# Patient Record
Sex: MALE | Race: WHITE | URBAN - METROPOLITAN AREA
[De-identification: names, ages, dates, MRNs, and addresses within clinical notes are randomized per-mention and may not be internally consistent; named-entity substitution may affect disease eponyms.]

---

## 2018-06-12 ENCOUNTER — OFFICE VISIT (OUTPATIENT)
Dept: FAMILY MEDICINE CLINIC | Age: 23
End: 2018-06-12

## 2018-06-12 VITALS
BODY MASS INDEX: 21.98 KG/M2 | HEART RATE: 54 BPM | HEIGHT: 71 IN | SYSTOLIC BLOOD PRESSURE: 128 MMHG | WEIGHT: 157 LBS | RESPIRATION RATE: 14 BRPM | OXYGEN SATURATION: 100 % | TEMPERATURE: 97.6 F | DIASTOLIC BLOOD PRESSURE: 66 MMHG

## 2018-06-12 DIAGNOSIS — K62.89 RECTAL PAIN: Primary | ICD-10-CM

## 2018-06-12 DIAGNOSIS — M79.89 SWELLING OF RIGHT HAND: ICD-10-CM

## 2018-06-12 RX ORDER — HYDROCORTISONE ACETATE 25 MG/1
25 SUPPOSITORY RECTAL
Qty: 10 SUPPOSITORY | Refills: 1 | Status: SHIPPED | OUTPATIENT
Start: 2018-06-12

## 2018-06-12 NOTE — MR AVS SNAPSHOT
[facility] 
 
 
 [address] 
[phone] Patient: Abel Marcano MRN: [id number] :1995 Visit Information Date & Time Provider Department Dept. Phone Encounter #  
 2018  9:30 AM Kimberlyn Valle,  [facility] [phone] [id number] Follow-up Instructions Return if symptoms worsen or fail to improve. Upcoming Health Maintenance Date Due  
 HPV Age 9Y-34Y (3 of 1 - Male 3 Dose Series) 2008 DTaP/Tdap/Td series (1 - Tdap) 2018 Influenza Age 5 to Adult 2018 Allergies as of 2018  Review Complete On: 2018 By: Brandon Gomez LPN Not on File Current Immunizations  Never Reviewed No immunizations on file. Not reviewed this visit You Were Diagnosed With   
  
 Codes Comments Rectal pain    -  Primary ICD-10-CM: K62.89 ICD-9-CM: 244.97 Swelling of right hand     ICD-10-CM: M79.89 ICD-9-CM: 729.81 Vitals BP Pulse Temp Resp Height(growth percentile) Weight(growth percentile) 128/66 (BP 1 Location: Left arm, BP Patient Position: Sitting) (!) 54 97.6 °F (36.4 °C) (Oral) 14 5' 11\" (1.803 m) 157 lb (71.2 kg) SpO2 BMI Smoking Status 100% 21.9 kg/m2 Never Smoker BMI and BSA Data Body Mass Index Body Surface Area  
 21.9 kg/m 2 1.89 m 2 Your Updated Medication List  
  
   
This list is accurate as of 18 10:16 AM.  Always use your most recent med list.  
  
  
  
  
 hydrocortisone 25 mg Supp Commonly known as:  ANUSOL-HC Insert 1 Suppository into rectum nightly. Prescriptions Printed Refills  
 hydrocortisone (ANUSOL-HC) 25 mg supp 1 Sig: Insert 1 Suppository into rectum nightly. Class: Print Route: Rectal  
  
Follow-up Instructions Return if symptoms worsen or fail to improve. To-Do List   
 2018   Imaging:  XR HAND RT MIN 3 V   
  
  
 Patient Instructions Anal Pain: Care Instructions Your Care Instructions Pain in the opening to the rectum (anus) can be caused by diarrhea or constipation or by scratching a rectal itch. A common cause of anal pain is a tear in the lining of the lower rectum (anal fissure). This type of anal pain usually goes away when the problem clears up. Injury during anal sex or from an object being placed in the rectum also can cause pain. A rare cause of anal pain is spasms of the muscles in the rectum. Some of these conditions may cause some light bleeding. Home treatment usually can relieve anal pain. If you continue to have anal pain, your doctor may prescribe medicine to relieve pain and other symptoms. Depending on the cause, you may need other treatment. Follow-up care is a key part of your treatment and safety. Be sure to make and go to all appointments, and call your doctor if you are having problems. It's also a good idea to know your test results and keep a list of the medicines you take. How can you care for yourself at home? · Sit in a few inches of warm water (sitz bath) 3 times a day and after bowel movements. The warm water eases discomfort. Do not put soaps, salts, or shampoos in the water. · Drink plenty of fluids, enough so that your urine is light yellow or clear like water. If you have kidney, heart, or liver disease and have to limit fluids, talk with your doctor before you increase the amount of fluids you drink. · Include high-fiber foods, such as fruits, vegetables, beans, and whole grains, in your diet each day. · Take a fiber supplement, such as Benefiber, Citrucel, or Metamucil, every day. Read and follow all instructions on the label. · Use the toilet when you feel the urge. Or when you can, schedule time each day for a bowel movement. A daily routine may help. Take your time and do not strain when having a bowel movement. But do not sit on the toilet too long. · Support your feet with a small step stool when you sit on the toilet. This helps flex your hips and places your pelvis in a squatting position. · Your doctor may recommend an over-the-counter laxative, such as Miralax, Milk of Magnesia, or Ex-Lax. Read and follow all instructions on the label, and do not use laxatives on a long-term basis. · Do not use over-the-counter ointments or creams without talking to your doctor. Some of these may not help. · Use baby wipes or medicated pads, such as Preparation H or Tucks, instead of toilet paper to clean after a bowel movement. These products do not irritate the anus. · Be safe with medicines. Read and follow all instructions on the label. ¨ If the doctor gave you a prescriptionmedicine for pain, give it as prescribed. ¨ If you are not taking a prescription pain medicine, ask your doctorif you can take an over-the-counter medicine. When should you call for help? Call your doctor now or seek immediate medical care if: 
? · You have new or worse pain. ? · You have new or worse bleeding from the rectum. ? Watch closely for changes in your health, and be sure to contact your doctor if: 
? · You have trouble passing stools. ? · You do not get better as expected. Where can you learn more? Go to http://jesse-jabari.info/. Enter 486 4821 in the search box to learn more about \"Anal Pain: Care Instructions. \" Current as of: May 12, 2017 Content Version: 11.4 © 8338-6891 [employer]. Care instructions adapted under license by [employer] (which disclaims liability or warranty for this information). If you have questions about a medical condition or this instruction, always ask your healthcare professional. [place] [address] any warranty or liability for your use of this information. Prilosec over the counter- one a day for 14 days. Introducing [facility]! [employer] introduces NotaryAct patient portal. Now you can access parts of your medical record, email your doctor's office, and request medication refills online. 1. In your internet browser, go to [email or web address] 2. Click on the First Time User? Click Here link in the Sign In box. You will see the New Member Sign Up page. 3. Enter your NotaryAct Access Code exactly as it appears below. You will not need to use this code after youve completed the sign-up process. If you do not sign up before the expiration date, you must request a new code. · NotaryAct Access Code: [email or web address] Expires: 9/10/2018 10:16 AM 
 
4. Enter the last four digits of your Social Security Number (xxxx) and Date of Birth (mm/dd/yyyy) as indicated and click Submit. You will be taken to the next sign-up page. 5. Create a NotaryAct ID. This will be your NotaryAct login ID and cannot be changed, so think of one that is secure and easy to remember. 6. Create a NotaryAct password. You can change your password at any time. 7. Enter your Password Reset Question and Answer. This can be used at a later time if you forget your password. 8. Enter your e-mail address. You will receive e-mail notification when new information is available in [address] 9. Click Sign Up. You can now view and download portions of your medical record. 10. Click the Download Summary menu link to download a portable copy of your medical information. If you have questions, please visit the Frequently Asked Questions section of the NotaryAct website. Remember, NotaryAct is NOT to be used for urgent needs. For medical emergencies, dial 911. Now available from your iPhone and Android! Please provide this summary of care documentation to your next provider. Your primary care clinician is listed as Napolean Player. If you have any questions after today's visit, please call [phone].

## 2018-06-12 NOTE — PROGRESS NOTES
HPI:   Valarie Hicks is a 26 y.o. male who presents to [facility] care. Reports a history of right hand injury after punching a wall 9 months ago. Initial swelling and pain. Patient reports this is his first evaluation. No prior treatment or imaging. Patient reports pain has resolved but there is residual swelling. Reports rectal pain persistent for several months. Previous evaluation at [facility] with improvement in dietary changes including the reduction of spicy foods as well as use of otc hydrocortisone cream.  Has been asymptomatic for one month with recent dietary improvements. Concerns about recurrence. In a same-sex relationship but denies anal intercourse. Denies melena or tarry stool. Previous Pediatrician was Dr. Cuca De La Paz. Not on File   There is no problem list on file for this patient. History reviewed. No pertinent past medical history. Past Surgical History:   Procedure Laterality Date    HX OTHER SURGICAL      benign lip cyst removal       No LMP for male patient. Family History   Problem Relation Age of Onset    No Known Problems Mother     No Known Problems Father       Social History     Social History    Marital status: SINGLE     Spouse name: N/A    Number of children: N/A    Years of education: N/A     Occupational History    Not on file. Social History Main Topics    Smoking status: Never Smoker    Smokeless tobacco: Never Used    Alcohol use Yes      Comment: on weekends    Drug use: No    Sexual activity: Yes     Partners: Male     Birth control/ protection: Condom      Comment: In a relationship      Other Topics Concern    Not on file     Social History Narrative    No narrative on file        ROS:   Review of Systems   Constitutional: Negative for chills, fever and weight loss. Gastrointestinal: Negative for abdominal pain, blood in stool, constipation, diarrhea, melena, nausea and vomiting. Genitourinary: Negative for dysuria. Musculoskeletal: Negative for myalgias. Skin: Negative for rash. Neurological: Negative for dizziness. Physical Exam:     Visit Vitals    /66 (BP 1 Location: Left arm, BP Patient Position: Sitting)    Pulse (!) 54    Temp 97.6 °F (36.4 °C) (Oral)    Resp 14    Ht 5' 11\" (1.803 m)    Wt 157 lb (71.2 kg)    SpO2 100%    BMI 21.9 kg/m2        Vitals and Nurse Documentation reviewed. Physical Exam   Constitutional: No distress. HENT:   Right Ear: Tympanic membrane is not erythematous and not bulging. No middle ear effusion. Left Ear: Tympanic membrane is not erythematous and not bulging. No middle ear effusion. Nose: No rhinorrhea. Right sinus exhibits no maxillary sinus tenderness and no frontal sinus tenderness. Left sinus exhibits no maxillary sinus tenderness and no frontal sinus tenderness. Mouth/Throat: No oropharyngeal exudate or posterior oropharyngeal erythema. Eyes: EOM and lids are normal.   Cardiovascular: S1 normal and S2 normal.  Exam reveals no gallop and no friction rub. No murmur heard. Pulmonary/Chest: Breath sounds normal. He has no wheezes. Musculoskeletal:        Right hand: He exhibits deformity. He exhibits normal range of motion, no tenderness, normal capillary refill, no laceration and no swelling. Normal sensation noted. Normal strength noted. Hands:  Lymphadenopathy:     He has no cervical adenopathy. Skin: Skin is warm and dry. Psychiatric: Mood and affect normal.         Assessment/ Plan:   [employer] were discussed including hours of operation, on-call providers, and MyChart. Diagnoses and all orders for this visit:    1. Rectal pain  -     hydrocortisone (ANUSOL-HC) 25 mg supp; Insert 1 Suppository into rectum nightly. Treatment as above. He is [facility]ocating to [facility] next week. He will follow up with proctology if no improvement. 2. Swelling of right hand  -     XR HAND RT MIN 3 V; Future  Rule out old fracture.   Appears stable over time. Nothing further needed. Discussed expected course/resolution/complications of diagnosis in detail with patient.    Medication risks/benefits/costs/interactions/alternatives discussed with patient.    Pt was given an after visit summary which includes diagnoses, current medications & vitals.    Pt expressed understanding with the diagnosis and plan      Follow-up Disposition:  Return if symptoms worsen or fail to improve.

## 2018-06-12 NOTE — PATIENT INSTRUCTIONS
Anal Pain: Care Instructions  Your Care Instructions  Pain in the opening to the rectum (anus) can be caused by diarrhea or constipation or by scratching a rectal itch. A common cause of anal pain is a tear in the lining of the lower rectum (anal fissure). This type of anal pain usually goes away when the problem clears up. Injury during anal sex or from an object being placed in the rectum also can cause pain. A rare cause of anal pain is spasms of the muscles in the rectum. Some of these conditions may cause some light bleeding. Home treatment usually can relieve anal pain. If you continue to have anal pain, your doctor may prescribe medicine to relieve pain and other symptoms. Depending on the cause, you may need other treatment. Follow-up care is a key part of your treatment and safety. Be sure to make and go to all appointments, and call your doctor if you are having problems. It's also a good idea to know your test results and keep a list of the medicines you take. How can you care for yourself at home? · Sit in a few inches of warm water (sitz bath) 3 times a day and after bowel movements. The warm water eases discomfort. Do not put soaps, salts, or shampoos in the water. · Drink plenty of fluids, enough so that your urine is light yellow or clear like water. If you have kidney, heart, or liver disease and have to limit fluids, talk with your doctor before you increase the amount of fluids you drink. · Include high-fiber foods, such as fruits, vegetables, beans, and whole grains, in your diet each day. · Take a fiber supplement, such as Benefiber, Citrucel, or Metamucil, every day. Read and follow all instructions on the label. · Use the toilet when you feel the urge. Or when you can, schedule time each day for a bowel movement. A daily routine may help. Take your time and do not strain when having a bowel movement. But do not sit on the toilet too long.   · Support your feet with a small step stool when you sit on the toilet. This helps flex your hips and places your pelvis in a squatting position. · Your doctor may recommend an over-the-counter laxative, such as Miralax, Milk of Magnesia, or Ex-Lax. Read and follow all instructions on the label, and do not use laxatives on a long-term basis. · Do not use over-the-counter ointments or creams without talking to your doctor. Some of these may not help. · Use baby wipes or medicated pads, such as Preparation H or Tucks, instead of toilet paper to clean after a bowel movement. These products do not irritate the anus. · Be safe with medicines. Read and follow all instructions on the label. ¨ If the doctor gave you a prescriptionmedicine for pain, give it as prescribed. ¨ If you are not taking a prescription pain medicine, ask your doctorif you can take an over-the-counter medicine. When should you call for help? Call your doctor now or seek immediate medical care if:  ? · You have new or worse pain. ? · You have new or worse bleeding from the rectum. ? Watch closely for changes in your health, and be sure to contact your doctor if:  ? · You have trouble passing stools. ? · You do not get better as expected. Where can you learn more? Go to http://jesse-jabari.info/. Enter 486 8015 in the search box to learn more about \"Anal Pain: Care Instructions. \"  Current as of: May 12, 2017  Content Version: 11.4  © 4844-9347 [employer]. Care instructions adapted under license by [employer] (which disclaims liability or warranty for this information). If you have questions about a medical condition or this instruction, always ask your healthcare professional. [place] [address] any warranty or liability for your use of this information. Prilosec over the counter- one a day for 14 days.